# Patient Record
Sex: FEMALE | Race: WHITE | Employment: UNEMPLOYED
[De-identification: names, ages, dates, MRNs, and addresses within clinical notes are randomized per-mention and may not be internally consistent; named-entity substitution may affect disease eponyms.]

---

## 2024-09-12 ENCOUNTER — HOSPITAL ENCOUNTER (OUTPATIENT)
Facility: HOSPITAL | Age: 59
Setting detail: RECURRING SERIES
Discharge: HOME | End: 2024-09-15
Payer: OTHER GOVERNMENT

## 2024-09-12 PROCEDURE — 97161 PT EVAL LOW COMPLEX 20 MIN: CPT

## 2024-09-12 PROCEDURE — 97110 THERAPEUTIC EXERCISES: CPT

## 2024-09-17 ENCOUNTER — HOSPITAL ENCOUNTER (OUTPATIENT)
Facility: HOSPITAL | Age: 59
Setting detail: RECURRING SERIES
Discharge: HOME | End: 2024-09-20
Payer: OTHER GOVERNMENT

## 2024-09-17 PROCEDURE — 97112 NEUROMUSCULAR REEDUCATION: CPT

## 2024-09-17 PROCEDURE — 97110 THERAPEUTIC EXERCISES: CPT

## 2024-09-17 PROCEDURE — 97140 MANUAL THERAPY 1/> REGIONS: CPT

## 2024-09-19 ENCOUNTER — HOSPITAL ENCOUNTER (OUTPATIENT)
Facility: HOSPITAL | Age: 59
Setting detail: RECURRING SERIES
Discharge: HOME | End: 2024-09-22
Payer: OTHER GOVERNMENT

## 2024-09-19 PROCEDURE — 97110 THERAPEUTIC EXERCISES: CPT

## 2024-09-19 PROCEDURE — 20561 NDL INSJ W/O NJX 3+ MUSC: CPT

## 2024-09-19 PROCEDURE — 97032 APPL MODALITY 1+ESTIM EA 15: CPT

## 2024-09-19 PROCEDURE — 97140 MANUAL THERAPY 1/> REGIONS: CPT

## 2024-09-23 ENCOUNTER — HOSPITAL ENCOUNTER (OUTPATIENT)
Facility: HOSPITAL | Age: 59
Setting detail: RECURRING SERIES
Discharge: HOME | End: 2024-09-26
Payer: OTHER GOVERNMENT

## 2024-09-23 PROCEDURE — 97110 THERAPEUTIC EXERCISES: CPT

## 2024-09-23 PROCEDURE — 97140 MANUAL THERAPY 1/> REGIONS: CPT

## 2024-09-23 PROCEDURE — 97112 NEUROMUSCULAR REEDUCATION: CPT

## 2024-09-26 ENCOUNTER — HOSPITAL ENCOUNTER (OUTPATIENT)
Facility: HOSPITAL | Age: 59
Setting detail: RECURRING SERIES
Discharge: HOME | End: 2024-09-29
Payer: OTHER GOVERNMENT

## 2024-09-26 PROCEDURE — 97110 THERAPEUTIC EXERCISES: CPT

## 2024-09-26 PROCEDURE — 97032 APPL MODALITY 1+ESTIM EA 15: CPT

## 2024-09-26 PROCEDURE — 20561 NDL INSJ W/O NJX 3+ MUSC: CPT

## 2024-09-26 PROCEDURE — 97140 MANUAL THERAPY 1/> REGIONS: CPT

## 2024-09-26 NOTE — PROGRESS NOTES
PHYSICAL / OCCUPATIONAL THERAPY - DAILY TREATMENT NOTE- DRY NEEDLE (updated )    Patient Name: Mary Méndez    Date: 2024    : 1965  Insurance: Payor:  EAST / Plan:  EAST SELECT / Product Type: *No Product type* /        Patient  verified YES   Visit #   Current / Total 5 16   Time   In / Out 1020 1115   Pain   In / Out 0 0   Subjective Functional Status/Changes: Hard to tell if things are getting better     TREATMENT AREA =  No admission diagnoses are documented for this encounter.    OBJECTIVE    Modalities Rationale:     decrease pain, improve mobility in order to perform ADLs with less pain  15 min [x] Estim, type/location: Direct using Pointer Excel II ( between 1-16 HZ) to DN                                       []  att     []  unatt     []  w/US     []  w/ice    []  w/heat    min []  Mechanical Traction: type/lbs                   []  pro   []  sup   []  int   []  cont    []  before manual    []  after manual    min []  Ultrasound, settings/location:      min []  Iontophoresis w/ dexamethasone, location:                                               []  take home patch       []  in clinic   10 Min/  unbilled [x]  Ice     []  Heat    location/position:     min []  Vasopneumatic Device, press/temp:              cold / med If using vaso       pre-treatment girth :       post-treatment girth :       measured at (location) :     min []  Other:    [x] Skin assessment post-treatment (if applicable):    [x]  intact    [x]  redness- no adverse reaction     []redness - adverse reaction:      Therapeutic Procedures:  Tx Min Billable or 1:1 Min (if diff from Tx Min) Procedure, Rationale, Specifics   5   Needle Insertion w/o Injection (3+ muscles) (un-timed): deactivate trigger points, improve muscle spasms, eliminate muscle imbalances, and decrease myofascial pain to improve patient's ability to progress to PLOF and address remaining functional goals.     Details if applicable:

## 2024-09-30 ENCOUNTER — HOSPITAL ENCOUNTER (OUTPATIENT)
Facility: HOSPITAL | Age: 59
Setting detail: RECURRING SERIES
Discharge: HOME | End: 2024-10-03
Payer: OTHER GOVERNMENT

## 2024-09-30 PROCEDURE — 97112 NEUROMUSCULAR REEDUCATION: CPT

## 2024-09-30 PROCEDURE — 97110 THERAPEUTIC EXERCISES: CPT

## 2024-09-30 PROCEDURE — 97140 MANUAL THERAPY 1/> REGIONS: CPT

## 2024-09-30 NOTE — PROGRESS NOTES
PHYSICAL / OCCUPATIONAL THERAPY - DAILY TREATMENT NOTE (updated )    Patient Name: Mary Méndez    Date: 2024    : 1965  Insurance: Payor:  EAST / Plan: [employer] EAST SELECT / Product Type: *No Product type* /      Patient  verified YES   Visit #   Current / Total 6 16   Time   In / Out 1020 1110   Pain   In / Out - -   Subjective Functional Status/Changes: Stiff right neck area-  worked garden show all weekend.    Vocal production is better.  Improved breathing to sing lashaun noted after D Lisseth       TREATMENT AREA =  Cervicalgia [M54.2]  Left shoulder pain [M25.512]  Right shoulder pain [M25.511]    OBJECTIVE    Modalities Rationale:     decrease pain and increase tissue extensibility to improve patient's ability to progress to PLOF and address remaining functional goals.     min [] Estim Unattended, type/location:                                     []  w/US     []  w/ice    []  w/heat    []  TENS insruct      min []  Mechanical Traction: type/lbs                   []  pro   []  sup   []  int   []  cont    []  before manual    []  after manual    min []  Ultrasound, settings/location:      min []  Iontophoresis w/ dexamethasone, location:                                               []  take home patch       []  in clinic   10 min  unbilled [x]  Ice     []  Heat    location/position:     min []  Paraffin,  details:     min []  Vasopneumatic Device, press/temp:     min []  Whirlpool / Fluido:    If using vaso (only need to measure limb vaso being performed on)      pre-treatment girth :       post-treatment girth :       measured at (landmark location) :      min []  Other:    [x] Skin assessment post-treatment (if applicable):    [x]  intact    []  redness- no adverse reaction                 []redness - adverse reaction:        Therapeutic Procedures:  Tx Min Billable or 1:1 Min (if diff from Tx Min) Procedure, Rationale, Specifics   15  62065 Therapeutic Exercise (timed):  increase ROM,

## 2024-10-03 ENCOUNTER — HOSPITAL ENCOUNTER (OUTPATIENT)
Facility: HOSPITAL | Age: 59
Setting detail: RECURRING SERIES
Discharge: HOME | End: 2024-10-06
Payer: OTHER GOVERNMENT

## 2024-10-03 PROCEDURE — 97032 APPL MODALITY 1+ESTIM EA 15: CPT

## 2024-10-03 PROCEDURE — 97140 MANUAL THERAPY 1/> REGIONS: CPT

## 2024-10-03 PROCEDURE — 97110 THERAPEUTIC EXERCISES: CPT

## 2024-10-03 PROCEDURE — 20561 NDL INSJ W/O NJX 3+ MUSC: CPT

## 2024-10-03 NOTE — PROGRESS NOTES
Purpose of dry needling, side effects, possible complications, risks and benefits were explained to the patient   [x] Procedure site(s) verified  [x] Patient was positioned for comfort and draped for privacy  [x] Informed Consent was signed (initial visit) and verified verbally (subsequent visits)  [x] Patient was instructed on the need to report the use of blood thinners and/or immunosuppressant medications  [x] How to respond to possible adverse effects of treatment  [x] Self treatment of post needling soreness: ice, heat (moist heat, heat wraps) and stretching  [x] Opportunity was given to ask any questions, all questions were answered            Treatment:  The following muscles were treated today:    Right: UT, SCM, pec   Left: same     Patient’s response to today’s treatment:   [x]  LTR’s  []  Muscle Relaxation  []  Pain Relief  []  Decreased Tinnitus  []  Decreased HA’s [x]  Post needling soreness [x]  Increased ROM   []  Other:       [x]  Patient Education billed concurrently with other procedures   [x] Review HEP    [] Progressed/Changed HEP, detail:    [] Other detail:       Objective Information/Functional Measures/Assessment    Cx ROM: rot r= 71, L= 68    Patient will continue to benefit from skilled PT / OT services to modify and progress therapeutic interventions, analyze and address functional mobility deficits, analyze and address ROM deficits, analyze and address soft tissue restrictions, and analyze and modify for postural abnormalities to address functional deficits and attain remaining goals.    Progress toward goals / Updated goals:  []  See Progress Note/Recertification    Progressing ROM    PLAN  yes Continue plan of care  []  Upgrade activities as tolerated  []  Discharge due to :  []  Other:    Delma Sterling, PT        If an interpreting service was utilized for treatment of this patient, the contents of this document represent the material reviewed with the patient via the .

## 2024-10-07 ENCOUNTER — HOSPITAL ENCOUNTER (OUTPATIENT)
Facility: HOSPITAL | Age: 59
Setting detail: RECURRING SERIES
Discharge: HOME | End: 2024-10-10
Payer: OTHER GOVERNMENT

## 2024-10-07 PROCEDURE — 97112 NEUROMUSCULAR REEDUCATION: CPT

## 2024-10-07 PROCEDURE — 97110 THERAPEUTIC EXERCISES: CPT

## 2024-10-07 PROCEDURE — 97140 MANUAL THERAPY 1/> REGIONS: CPT

## 2024-10-07 NOTE — PROGRESS NOTES
PHYSICAL / OCCUPATIONAL THERAPY - DAILY TREATMENT NOTE (updated )    Patient Name: Mary Méndez    Date: 10/7/2024    : 1965  Insurance: Payor: [employer] EAST / Plan:  EAST SELECT / Product Type: *No Product type* /      Patient  verified YES   Visit #   Current / Total 8 16   Time   In / Out 940 1033   Pain   In / Out 3 1-2   Subjective Functional Status/Changes: Pain this day in shoulder and back after  leaned on her shoulder for prolonged time yesterday.    Singing went much better       TREATMENT AREA =  Cervicalgia [M54.2]  Left shoulder pain [M25.512]  Right shoulder pain [M25.511]    OBJECTIVE    Modalities Rationale:     decrease pain and increase tissue extensibility to improve patient's ability to progress to PLOF and address remaining functional goals.                   min [] Estim Unattended, type/location:                                                           []  w/US     []  w/ice    []  w/heat    []  TENS insruct       min []  Mechanical Traction: type/lbs                    []  pro   []  sup   []  int   []  cont    []  before manual    []  after manual     min []  Ultrasound, settings/location:        min []  Iontophoresis w/ dexamethasone, location:                                                []  take home patch       []  in clinic   10 min  unbilled []  Ice     [x]  Heat    location/position:       min []  Paraffin,  details:       min []  Vasopneumatic Device, press/temp:       min []  Whirlpool / Fluido:     If using vaso (only need to measure limb vaso being performed on)      pre-treatment girth :       post-treatment girth :       measured at (landmark location) :       min []  Other:     [x] Skin assessment post-treatment (if applicable):    [x]  intact    []  redness- no adverse reaction                 []redness - adverse reaction:           Therapeutic Procedures:  Tx Min Billable or 1:1 Min (if diff from Tx Min) Procedure, Rationale, Specifics   15

## 2024-10-10 ENCOUNTER — HOSPITAL ENCOUNTER (OUTPATIENT)
Facility: HOSPITAL | Age: 59
Setting detail: RECURRING SERIES
Discharge: HOME | End: 2024-10-13
Payer: OTHER GOVERNMENT

## 2024-10-10 PROCEDURE — 97110 THERAPEUTIC EXERCISES: CPT

## 2024-10-10 PROCEDURE — 20561 NDL INSJ W/O NJX 3+ MUSC: CPT

## 2024-10-10 PROCEDURE — 97032 APPL MODALITY 1+ESTIM EA 15: CPT

## 2024-10-10 PROCEDURE — 97140 MANUAL THERAPY 1/> REGIONS: CPT

## 2024-10-10 NOTE — PROGRESS NOTES
Agreement on all muscles being treated was verified   [x] Purpose of dry needling, side effects, possible complications, risks and benefits were explained to the patient   [x] Procedure site(s) verified  [x] Patient was positioned for comfort and draped for privacy  [x] Informed Consent was signed (initial visit) and verified verbally (subsequent visits)  [x] Patient was instructed on the need to report the use of blood thinners and/or immunosuppressant medications  [x] How to respond to possible adverse effects of treatment  [x] Self treatment of post needling soreness: ice, heat (moist heat, heat wraps) and stretching  [x] Opportunity was given to ask any questions, all questions were answered            Treatment:  The following muscles were treated today:    Right: UT, LS, mid trap   Left:      Patient’s response to today’s treatment:   [x]  LTR’s  []  Muscle Relaxation  [x]  Pain Relief  []  Decreased Tinnitus  []  Decreased HA’s []  Post needling soreness [x]  Increased ROM   []  Other:       [x]  Patient Education billed concurrently with other procedures   [x] Review HEP    [] Progressed/Changed HEP, detail:    [] Other detail:       Patient will continue to benefit from skilled PT / OT services to modify and progress therapeutic interventions, analyze and address functional mobility deficits, analyze and address ROM deficits, analyze and address soft tissue restrictions, and analyze and modify for postural abnormalities to address functional deficits and attain remaining goals.     Progress toward goals / Updated goals:  []  See Progress Note/Recertification  Short Term Goals:   1 Patient will report >= 25% improvement in symptoms with ADLs- progressing  2 Patient will be educated in appropriate ROM, stabilization, strengthening exercises.--progressing  3 Increased Cervical rotation >= 70 degrees for improved ADLs- tight R rot today  Long Term Goals:   1 Patient to report >= 75% improvement in symptoms with

## 2024-10-24 ENCOUNTER — HOSPITAL ENCOUNTER (OUTPATIENT)
Facility: HOSPITAL | Age: 59
Setting detail: RECURRING SERIES
Discharge: HOME | End: 2024-10-27
Payer: OTHER GOVERNMENT

## 2024-10-24 PROCEDURE — 97110 THERAPEUTIC EXERCISES: CPT

## 2024-10-24 PROCEDURE — 97140 MANUAL THERAPY 1/> REGIONS: CPT

## 2024-10-24 PROCEDURE — 20561 NDL INSJ W/O NJX 3+ MUSC: CPT

## 2024-10-24 PROCEDURE — 97032 APPL MODALITY 1+ESTIM EA 15: CPT

## 2024-10-24 NOTE — PROGRESS NOTES
PHYSICAL / OCCUPATIONAL THERAPY - DAILY TREATMENT NOTE- DRY NEEDLE (updated )       Patient Name: Mary Méndez    Date: 10/24/2024    : 1965  Insurance: Payor:  EAST / Plan:  EAST SELECT / Product Type: *No Product type* /             Patient  verified YES    Visit #   Current / Total 10      Time   In / Out 345 445    Pain   In / Out 0 0    Subjective Functional Status/Changes: Overall- > 50% improved.          TREATMENT AREA =  Cervicalgia [M54.2]  Left shoulder pain [M25.512]  Right shoulder pain [M25.511]    OBJECTIVE    Modalities Rationale:     decrease pain, improve mobility in order to perform ADLs with less pain  10 min [x] Estim, type/location: Direct using Pointer Excel II ( between 1-16 HZ) to DN                                       []  att     []  unatt     []  w/US     []  w/ice    []  w/heat    min []  Mechanical Traction: type/lbs                   []  pro   []  sup   []  int   []  cont    []  before manual    []  after manual    min []  Ultrasound, settings/location:      min []  Iontophoresis w/ dexamethasone, location:                                               []  take home patch       []  in clinic    Min/  unbilled []  Ice     []  Heat    location/position:     min []  Vasopneumatic Device, press/temp:              cold / med If using vaso       pre-treatment girth :       post-treatment girth :       measured at (location) :     min []  Other:    [x] Skin assessment post-treatment (if applicable):    [x]  intact    []  redness- no adverse reaction     []redness - adverse reaction:      Therapeutic Procedures:  Tx Min Billable or 1:1 Min (if diff from Tx Min) Procedure, Rationale, Specifics   10  92073 Needle Insertion w/o Injection (3+ muscles) (un-timed): deactivate trigger points, improve muscle spasms, eliminate muscle imbalances, and decrease myofascial pain to improve patient's ability to progress to PLOF and address remaining functional goals.     Details

## 2024-10-24 NOTE — PROGRESS NOTES
[facility]   [address], [address], [place] [address] Ph: [phone] Fx: [phone]  PHYSICAL THERAPY PROGRESS NOTE  Patient Name: Mary Méndez : 1965   Treatment/Medical Diagnosis: Cervicalgia [M54.2]  Left shoulder pain [M25.512]  Right shoulder pain [M25.511]   Referral Source: Jose Dickerson MD     Date of Initial Visit: 24 Attended Visits: 10 Missed Visits: -     SUMMARY OF TREATMENT  Patient has been seen in PT for 10 visits.  Treatment has consisted of 28708 Therapeutic Exercise, 08375 Neuromuscular Re-Education, 45914 Manual Therapy, 18005 Therapeutic Activity, 21004 Self Care/Home Management, 35041 Electrical Stim attended, and (Elective Self Pay) Needle Insertion w/o Injection (1 or 2 muscles), (3+ muscles).  Patient has also been instructed in HEP, activity modification, posture/ body mechanics.      CURRENT STATUS  Patient is progressing well through this course of PT.  She reports > 50% improvement in symptoms.  She states that she is more aware of her posture and her breathing patterns.  She reports/ demonstrates improved neck ROM.    She continues to c/o loss of end range Cx rotation.  Forward head posture and forward , rounded shoulders are still prevelent.  She reports intermittent difficulties with voice projection while singing    PRIOR GOALS:  1 Patient to report >= 75% improvement in symptoms with ADLs.  2 Patient will be independent with finalized HEP/ self maintenance.  3 Increase NDI score <= 16% to indicate improved function with use of CS.  4 GROC score >= +4 to indicate decreased symptoms with speech    Status at last Eval: n/a  Current Status: > 50%   Goal Met?   progressing     2.  Status at last Eval: n/a  Current Status: indep with current HEP  Goal Met?   progressing    3.  Status at last Eval: 22%  Current Status: 24%  Goal Met?  no    4.  Status at last Eval: n/a  Current Status: +5  Goal Met?

## 2024-11-07 ENCOUNTER — HOSPITAL ENCOUNTER (OUTPATIENT)
Facility: HOSPITAL | Age: 59
Setting detail: RECURRING SERIES
Discharge: HOME | End: 2024-11-10
Payer: OTHER GOVERNMENT

## 2024-11-07 PROCEDURE — 97110 THERAPEUTIC EXERCISES: CPT

## 2024-11-07 PROCEDURE — 97140 MANUAL THERAPY 1/> REGIONS: CPT

## 2024-11-07 PROCEDURE — 97112 NEUROMUSCULAR REEDUCATION: CPT

## 2024-11-07 NOTE — PROGRESS NOTES
PHYSICAL / OCCUPATIONAL THERAPY - DAILY TREATMENT NOTE (updated )    Patient Name: Mary Méndez    Date: 2024    : 1965  Insurance: Payor: [employer] EAST / Plan: [employer] EAST SELECT / Product Type: *No Product type* /      Patient  verified YES   Visit #   Current / Total 11 18   Time   In / Out 820 915   Pain   In / Out 0 0   Subjective Functional Status/Changes: Just tight.  Have HA in frontal region from        TREATMENT AREA =  Cervicalgia [M54.2]  Left shoulder pain [M25.512]  Right shoulder pain [M25.511]    OBJECTIVE    Modalities Rationale:     decrease pain and increase tissue extensibility to improve patient's ability to progress to PLOF and address remaining functional goals.     min [] Estim Unattended, type/location:                                     []  w/US     []  w/ice    []  w/heat    []  TENS insruct      min []  Mechanical Traction: type/lbs                   []  pro   []  sup   []  int   []  cont    []  before manual    []  after manual    min []  Ultrasound, settings/location:      min []  Iontophoresis w/ dexamethasone, location:                                               []  take home patch       []  in clinic   10 min  unbilled []  Ice     [x]  Heat    location/position:     min []  Paraffin,  details:     min []  Vasopneumatic Device, press/temp:     min []  Whirlpool / Fluido:    If using vaso (only need to measure limb vaso being performed on)      pre-treatment girth :       post-treatment girth :       measured at (landmark location) :      min []  Other:    [x] Skin assessment post-treatment (if applicable):    [x]  intact    []  redness- no adverse reaction                 []redness - adverse reaction:        Therapeutic Procedures:  Tx Min Billable or 1:1 Min (if diff from Tx Min) Procedure, Rationale, Specifics   18  99898 Therapeutic Exercise (timed):  increase ROM, strength, coordination, balance, and proprioception to improve patient's ability to progress

## 2024-11-13 ENCOUNTER — HOSPITAL ENCOUNTER (OUTPATIENT)
Facility: HOSPITAL | Age: 59
Setting detail: RECURRING SERIES
Discharge: HOME | End: 2024-11-16
Payer: OTHER GOVERNMENT

## 2024-11-13 PROCEDURE — 97140 MANUAL THERAPY 1/> REGIONS: CPT

## 2024-11-13 PROCEDURE — 20561 NDL INSJ W/O NJX 3+ MUSC: CPT

## 2024-11-13 PROCEDURE — 97110 THERAPEUTIC EXERCISES: CPT

## 2024-11-13 PROCEDURE — 97032 APPL MODALITY 1+ESTIM EA 15: CPT

## 2024-11-13 NOTE — PROGRESS NOTES
PHYSICAL / OCCUPATIONAL THERAPY - DAILY TREATMENT NOTE- DRY NEEDLE (updated )    Patient Name: Mary Méndez    Date: 2024    : 1965  Insurance: Payor: [employer] EAST / Plan:  EAST SELECT / Product Type: *No Product type* /        Patient  verified YES   Visit #   Current / Total 12 18   Time   In / Out 942 1050   Pain   In / Out 0 0   Subjective Functional Status/Changes: Breathing better, moving better      TREATMENT AREA =  Cervicalgia [M54.2]  Left shoulder pain [M25.512]  Right shoulder pain [M25.511]    OBJECTIVE    Modalities Rationale:     decrease pain, improve mobility in order to perform ADLs with less pain  10 min [x] Estim, type/location: Direct using Pointer Excel II ( between 1-16 HZ) to DN                                       []  att     []  unatt     []  w/US     []  w/ice    []  w/heat    min []  Mechanical Traction: type/lbs                   []  pro   []  sup   []  int   []  cont    []  before manual    []  after manual    min []  Ultrasound, settings/location:      min []  Iontophoresis w/ dexamethasone, location:                                               []  take home patch       []  in clinic   10 Min/  unbilled []  Ice     [x]  Heat    location/position:     min []  Vasopneumatic Device, press/temp:              cold / med If using vaso       pre-treatment girth :       post-treatment girth :       measured at (location) :     min []  Other:    [x] Skin assessment post-treatment (if applicable):    [x]  intact    []  redness- no adverse reaction     []redness - adverse reaction:      Therapeutic Procedures:  Tx Min Billable or 1:1 Min (if diff from Tx Min) Procedure, Rationale, Specifics   5   Needle Insertion w/o Injection (3+ muscles) (un-timed): deactivate trigger points, improve muscle spasms, eliminate muscle imbalances, and decrease myofascial pain to improve patient's ability to progress to PLOF and address remaining functional goals.     Details if

## 2024-11-15 ENCOUNTER — HOSPITAL ENCOUNTER (OUTPATIENT)
Facility: HOSPITAL | Age: 59
Setting detail: RECURRING SERIES
Discharge: HOME | End: 2024-11-18
Payer: OTHER GOVERNMENT

## 2024-11-15 PROCEDURE — 97112 NEUROMUSCULAR REEDUCATION: CPT

## 2024-11-15 PROCEDURE — 97110 THERAPEUTIC EXERCISES: CPT

## 2024-11-15 PROCEDURE — 97140 MANUAL THERAPY 1/> REGIONS: CPT

## 2024-11-15 NOTE — PROGRESS NOTES
PHYSICAL / OCCUPATIONAL THERAPY - DAILY TREATMENT NOTE (updated )    Patient Name: Mary Méndez    Date: 11/15/2024    : 1965  Insurance: Payor:  EAST / Plan: [employer] EAST SELECT / Product Type: *No Product type* /      Patient  verified YES   Visit #   Current / Total 13 18   Time   In / Out 1222 120   Pain   In / Out 0 0   Subjective Functional Status/Changes: Mild soreness after DNing       TREATMENT AREA =  Cervicalgia [M54.2]  Left shoulder pain [M25.512]  Right shoulder pain [M25.511]    OBJECTIVE    Modalities Rationale:     decrease pain and increase tissue extensibility to improve patient's ability to progress to PLOF and address remaining functional goals.     min [] Estim Unattended, type/location:                                     []  w/US     []  w/ice    []  w/heat    []  TENS insruct      min []  Mechanical Traction: type/lbs                   []  pro   []  sup   []  int   []  cont    []  before manual    []  after manual    min []  Ultrasound, settings/location:      min []  Iontophoresis w/ dexamethasone, location:                                               []  take home patch       []  in clinic   10 min  unbilled []  Ice     [x]  Heat    location/position:     min []  Paraffin,  details:     min []  Vasopneumatic Device, press/temp:     min []  Whirlpool / Fluido:    If using vaso (only need to measure limb vaso being performed on)      pre-treatment girth :       post-treatment girth :       measured at (landmark location) :      min []  Other:    [x] Skin assessment post-treatment (if applicable):    [x]  intact    []  redness- no adverse reaction                 []redness - adverse reaction:        Therapeutic Procedures:  Tx Min Billable or 1:1 Min (if diff from Tx Min) Procedure, Rationale, Specifics   23  20813 Therapeutic Exercise (timed):  increase ROM, strength, coordination, balance, and proprioception to improve patient's ability to progress to PLOF and

## 2024-11-18 ENCOUNTER — HOSPITAL ENCOUNTER (OUTPATIENT)
Facility: HOSPITAL | Age: 59
Setting detail: RECURRING SERIES
Discharge: HOME | End: 2024-11-21
Payer: OTHER GOVERNMENT

## 2024-11-18 PROCEDURE — 97140 MANUAL THERAPY 1/> REGIONS: CPT

## 2024-11-18 PROCEDURE — 97110 THERAPEUTIC EXERCISES: CPT

## 2024-11-18 PROCEDURE — 97032 APPL MODALITY 1+ESTIM EA 15: CPT

## 2024-11-18 PROCEDURE — 20561 NDL INSJ W/O NJX 3+ MUSC: CPT

## 2024-11-20 ENCOUNTER — APPOINTMENT (OUTPATIENT)
Facility: HOSPITAL | Age: 59
End: 2024-11-20
Payer: OTHER GOVERNMENT

## 2024-11-21 ENCOUNTER — HOSPITAL ENCOUNTER (OUTPATIENT)
Facility: HOSPITAL | Age: 59
Setting detail: RECURRING SERIES
Discharge: HOME | End: 2024-11-24
Payer: OTHER GOVERNMENT

## 2024-11-21 PROCEDURE — 97110 THERAPEUTIC EXERCISES: CPT

## 2024-11-21 PROCEDURE — 97112 NEUROMUSCULAR REEDUCATION: CPT

## 2024-11-21 PROCEDURE — 97140 MANUAL THERAPY 1/> REGIONS: CPT

## 2024-11-21 NOTE — PROGRESS NOTES
PHYSICAL / OCCUPATIONAL THERAPY - DAILY TREATMENT NOTE (updated )    Patient Name: Mary Méndez    Date: 2024    : 1965  Insurance: Payor: [employer] EAST / Plan: [employer] EAST SELECT / Product Type: *No Product type* /      Patient  verified YES   Visit #   Current / Total 15 18   Time   In / Out 822 920   Pain   In / Out 0 0   Subjective Functional Status/Changes: Doing well; getting better; 70% improved       TREATMENT AREA =  Cervicalgia [M54.2]  Left shoulder pain [M25.512]  Right shoulder pain [M25.511]    OBJECTIVE    Modalities Rationale:     decrease pain and increase tissue extensibility to improve patient's ability to progress to PLOF and address remaining functional goals.     min [] Estim Unattended, type/location:                                     []  w/US     []  w/ice    []  w/heat    []  TENS insruct      min []  Mechanical Traction: type/lbs                   []  pro   []  sup   []  int   []  cont    []  before manual    []  after manual    min []  Ultrasound, settings/location:      min []  Iontophoresis w/ dexamethasone, location:                                               []  take home patch       []  in clinic   10 min  unbilled []  Ice     [x]  Heat    location/position:     min []  Paraffin,  details:     min []  Vasopneumatic Device, press/temp:     min []  Whirlpool / Fluido:    If using vaso (only need to measure limb vaso being performed on)      pre-treatment girth :       post-treatment girth :       measured at (landmark location) :      min []  Other:    [x] Skin assessment post-treatment (if applicable):    [x]  intact    []  redness- no adverse reaction                 []redness - adverse reaction:        Therapeutic Procedures:  Tx Min Billable or 1:1 Min (if diff from Tx Min) Procedure, Rationale, Specifics   25  94237 Therapeutic Exercise (timed):  increase ROM, strength, coordination, balance, and proprioception to improve patient's ability to progress to

## 2024-11-26 ENCOUNTER — HOSPITAL ENCOUNTER (OUTPATIENT)
Facility: HOSPITAL | Age: 59
Setting detail: RECURRING SERIES
Discharge: HOME | End: 2024-11-29
Payer: OTHER GOVERNMENT

## 2024-11-26 PROCEDURE — 97140 MANUAL THERAPY 1/> REGIONS: CPT

## 2024-11-26 PROCEDURE — 97110 THERAPEUTIC EXERCISES: CPT

## 2024-11-26 PROCEDURE — 97032 APPL MODALITY 1+ESTIM EA 15: CPT

## 2024-11-26 PROCEDURE — 20561 NDL INSJ W/O NJX 3+ MUSC: CPT

## 2024-11-26 NOTE — PROGRESS NOTES
PHYSICAL / OCCUPATIONAL THERAPY - DAILY TREATMENT NOTE- DRY NEEDLE (updated )    Patient Name: Mary Méndez    Date: 2024    : 1965  Insurance: Payor: [employer] EAST / Plan:  EAST SELECT / Product Type: *No Product type* /        Patient  verified YES   Visit #   Current / Total 16 18   Time   In / Out 902 1015   Pain   In / Out 0 0   Subjective Functional Status/Changes: See note     TREATMENT AREA =  Cervicalgia [M54.2]  Left shoulder pain [M25.512]  Right shoulder pain [M25.511]    OBJECTIVE    Modalities Rationale:     decrease pain, improve mobility in order to perform ADLs with less pain  10 min [x] Estim, type/location: Direct using Pointer Excel II ( between 1-16 HZ) to DN                                       []  att     []  unatt     []  w/US     []  w/ice    []  w/heat    min []  Mechanical Traction: type/lbs                   []  pro   []  sup   []  int   []  cont    []  before manual    []  after manual    min []  Ultrasound, settings/location:      min []  Iontophoresis w/ dexamethasone, location:                                               []  take home patch       []  in clinic   10 Min/  unbilled []  Ice     [x]  Heat    location/position:     min []  Vasopneumatic Device, press/temp:              cold / med If using vaso       pre-treatment girth :       post-treatment girth :       measured at (location) :     min []  Other:    [x] Skin assessment post-treatment (if applicable):    [x]  intact    []  redness- no adverse reaction     []redness - adverse reaction:      Therapeutic Procedures:  Tx Min Billable or 1:1 Min (if diff from Tx Min) Procedure, Rationale, Specifics   5  80777 Needle Insertion w/o Injection (3+ muscles) (un-timed): deactivate trigger points, improve muscle spasms, eliminate muscle imbalances, and decrease myofascial pain to improve patient's ability to progress to PLOF and address remaining functional goals.     Details if applicable:

## 2024-11-26 NOTE — DISCHARGE SUMMARY
[facility]   [address], [address], [place] [address] Ph: [phone] Fx: [phone]  PHYSICAL THERAPY DISCHARGE NOTE            Patient Name: Mary Méndez : 1965   Treatment/Medical Diagnosis: Cervicalgia [M54.2]  Left shoulder pain [M25.512]  Right shoulder pain [M25.511]   Referral Source: Jose Dickerson MD       Date of Initial Visit: 24 Attended Visits: 16 Missed Visits: -      SUMMARY OF TREATMENT  Patient has been seen in PT for 16 visits.  Treatment has consisted of 32905 Therapeutic Exercise, 15968 Neuromuscular Re-Education, 30663 Manual Therapy, 96273 Therapeutic Activity, 27579 Self Care/Home Management, 90767 Electrical Stim attended, and (Elective Self Pay) Needle Insertion w/o Injection (1 or 2 muscles), (3+ muscles).  Patient has also been instructed in HEP, activity modification, posture/ body mechanics.       CURRENT STATUS  Patient is progressing well through this course of PT.  She reports 70% improvement.  ROM has improved.  She reports less stiffness.  Overall, she reports improved voice production    Patient has reached plateau with progress in PT.     PRIOR GOALS:  1 Patient to report >= 75% improvement in symptoms with ADLs.  2 Patient will be independent with finalized HEP/ self maintenance.  3 Increase NDI score <= 16% to indicate improved function with use of CS.  4 GROC score >= +4 to indicate decreased symptoms with speech     Status at last Eval: > 50%   Current Status: 70%  Goal Met?   Partially met     2.  Status at last Eval:indep with current HEP  Current Status: indep with final HEP  Goal Met?   yes     3.  Status at last Eval: 24%  Current Status: 8%  Goal Met?  yes     4. GOAL PREVIOUSLY MET        RECOMMENDATIONS  Continue PT per current POC  If you have any questions/comments please contact us directly at [phone].   Thank you for allowing us to assist in the care of your patient.